# Patient Record
Sex: MALE | Race: ASIAN | NOT HISPANIC OR LATINO
[De-identification: names, ages, dates, MRNs, and addresses within clinical notes are randomized per-mention and may not be internally consistent; named-entity substitution may affect disease eponyms.]

---

## 2023-06-21 ENCOUNTER — NON-APPOINTMENT (OUTPATIENT)
Age: 63
End: 2023-06-21

## 2023-06-21 ENCOUNTER — APPOINTMENT (OUTPATIENT)
Dept: CARDIOLOGY | Facility: CLINIC | Age: 63
End: 2023-06-21
Payer: MEDICAID

## 2023-06-21 VITALS
DIASTOLIC BLOOD PRESSURE: 76 MMHG | BODY MASS INDEX: 25.91 KG/M2 | TEMPERATURE: 96.8 F | OXYGEN SATURATION: 97 % | RESPIRATION RATE: 18 BRPM | HEART RATE: 106 BPM | SYSTOLIC BLOOD PRESSURE: 182 MMHG | HEIGHT: 68.11 IN | WEIGHT: 171 LBS

## 2023-06-21 DIAGNOSIS — Z82.49 FAMILY HISTORY OF ISCHEMIC HEART DISEASE AND OTHER DISEASES OF THE CIRCULATORY SYSTEM: ICD-10-CM

## 2023-06-21 DIAGNOSIS — R07.89 OTHER CHEST PAIN: ICD-10-CM

## 2023-06-21 DIAGNOSIS — F17.200 NICOTINE DEPENDENCE, UNSPECIFIED, UNCOMPLICATED: ICD-10-CM

## 2023-06-21 DIAGNOSIS — Z78.9 OTHER SPECIFIED HEALTH STATUS: ICD-10-CM

## 2023-06-21 DIAGNOSIS — R06.02 SHORTNESS OF BREATH: ICD-10-CM

## 2023-06-21 PROBLEM — Z00.00 ENCOUNTER FOR PREVENTIVE HEALTH EXAMINATION: Status: ACTIVE | Noted: 2023-06-21

## 2023-06-21 PROCEDURE — 93015 CV STRESS TEST SUPVJ I&R: CPT

## 2023-06-21 PROCEDURE — 93306 TTE W/DOPPLER COMPLETE: CPT

## 2023-06-21 PROCEDURE — 99204 OFFICE O/P NEW MOD 45 MIN: CPT | Mod: 25

## 2023-06-21 PROCEDURE — 93000 ELECTROCARDIOGRAM COMPLETE: CPT | Mod: 59

## 2023-06-23 PROBLEM — R07.89 CHEST DISCOMFORT: Status: ACTIVE | Noted: 2023-06-21

## 2023-07-03 NOTE — ASSESSMENT
[FreeTextEntry1] : 62 year old M smoker with HTN and HLD who presents for cardiac evaluation.\par \par Pt states for the past 3-4 months, he has been experiencing CP in the morning while he was walking to take transportation to work. It is also associated with shortness of breath. He also notices his HRs a little higher than usual. He has no symptoms at rest. He tried to take a Chinese "heart saving" medication which might have helped with his symptoms. No palpitations, orthopnea, PND, LE edema, dizziness, or LOC. He denies any previous cardiac history. \par \par He is on ASA 81, atorva 20mg daily. He is on Toprol 50 daily and losartan 50mg daily.\par \par 1) Chest discomfort\par 2) SOB on exertion\par - EKG 6/21/23 showed sinus rhythm without significant abnormalities\par - Pt underwent treadmill stress for 6:41 min Kem protocol, developed mild chest discomfort/SOB (similar to what he was describing) and had 1.5 mm upsloping STDs, concerning for possible ischemia. He also had hypertensive response to stress\par - Pt underwent TTE which showed hyperdynamic LVEF 70-75%, normal diastolic function, normal RV size/function with mild AI, trace TR/trace MR\par - Given abnormal treadmill stress, I advised pt to undergo CCTA to r/o CAD\par - Continue ASA 81 and atorva 20mg daily for med mgt of possible CAD (last LDL 47)\par \par 3) HTN\par - I advised pt to take his BP medication consistently. Consider increasing losartan if persistently hypertensive.\par \par 4) Follow-up, pending CCTA

## 2023-07-03 NOTE — HISTORY OF PRESENT ILLNESS
[FreeTextEntry1] : 62 year old M smoker with HTN and HLD who presents for cardiac evaluation.\par \par Pt states for the past 3-4 months, he has been experiencing CP in the morning while he was walking to take transportation to work. It is also associated with shortness of breath. He also notices his HRs a little higher than usual. He has no symptoms at rest. He tried to take a Chinese "heart saving" medication which might have helped with his symptoms. No palpitations, orthopnea, PND, LE edema, dizziness, or LOC. He denies any previous cardiac history. \par \par He is on ASA 81, atorva 20mg daily. He is on Toprol 50 daily and losartan 50mg daily.

## 2023-07-19 ENCOUNTER — NON-APPOINTMENT (OUTPATIENT)
Age: 63
End: 2023-07-19

## 2023-07-19 RX ORDER — NITROGLYCERIN 0.4 MG/1
0.4 TABLET SUBLINGUAL
Qty: 60 | Refills: 0 | Status: ACTIVE | COMMUNITY
Start: 2023-07-19 | End: 1900-01-01

## 2023-07-25 ENCOUNTER — NON-APPOINTMENT (OUTPATIENT)
Age: 63
End: 2023-07-25

## 2023-07-25 ENCOUNTER — APPOINTMENT (OUTPATIENT)
Dept: CARDIOLOGY | Facility: CLINIC | Age: 63
End: 2023-07-25
Payer: MEDICAID

## 2023-07-25 VITALS
RESPIRATION RATE: 18 BRPM | HEART RATE: 87 BPM | DIASTOLIC BLOOD PRESSURE: 71 MMHG | OXYGEN SATURATION: 98 % | TEMPERATURE: 97 F | BODY MASS INDEX: 25.76 KG/M2 | SYSTOLIC BLOOD PRESSURE: 133 MMHG | WEIGHT: 170 LBS

## 2023-07-25 PROCEDURE — 93000 ELECTROCARDIOGRAM COMPLETE: CPT

## 2023-07-25 PROCEDURE — 99214 OFFICE O/P EST MOD 30 MIN: CPT | Mod: 25

## 2023-07-25 NOTE — HISTORY OF PRESENT ILLNESS
[FreeTextEntry1] : Pt had abnormal CCTA leading to LHC with BLUE placed to 99% mid RCA lesion on 7/21/23. He feels well post procedure. His atorva 20 was increased to 40mg daily. He has quit smoking cigarettes. No chest pain or shortness of breath. No issues with bleeding. He is going out of state for a month.\par \par 6/21/23 - Pt states for the past 3-4 months, he has been experiencing CP in the morning while he was walking to take transportation to work. It is also associated with shortness of breath. He also notices his HRs a little higher than usual. He has no symptoms at rest. He tried to take a Chinese "heart saving" medication which might have helped with his symptoms. No palpitations, orthopnea, PND, LE edema, dizziness, or LOC. He denies any previous cardiac history.

## 2023-07-25 NOTE — REASON FOR VISIT
[FreeTextEntry1] : 62 year old M smoker with HTN, HLD, abnormal CCTA now s/p BLUE to mid RCA 99% lesion who presents for f/u.\par \par [facility] 7/21/23 - mid LAD 30-40, mid RCA 99% s/p BLUE, pLCx 30%\par \par He is on ASA 81, Brilinta 90mg BID, and atorva 40mg daily. He is on Toprol 50 daily and losartan 50mg daily.

## 2023-07-25 NOTE — ASSESSMENT
[FreeTextEntry1] : 62 year old M smoker with HTN, HLD, abnormal CCTA now s/p BLUE to mid RCA 99% lesion who presents for f/u.\par \par [facility] 7/21/23 - mid LAD 30-40, mid RCA 99% s/p BLUE, pLCx 30%\par \par He is on ASA 81, Brilinta 90mg BID, and atorva 40mg daily. He is on Toprol 50 daily and losartan 50mg daily. \par \par Pt had abnormal CCTA leading to LHC with BLUE placed to 99% mid RCA lesion on 7/21/23. He feels well post procedure. His atorva 20 was increased to 40mg daily. He has quit smoking cigarettes. No chest pain or shortness of breath. No issues with bleeding. He is going out of state for a month.\par \par 1) CAD s/p RCA stent, done for abnormal treadmill stress then abnormal CCTA\par - TTE 6/21/23 hyperdynamic LVEF 70-75%, normal diastolic function, normal RV size/function with mild AI, trace TR/trace MR\par - EKG 6/21/23 showed sinus rhythm without significant abnormalities\par - He is currently asymptomatic\par - Continue ASA 81 and Brilinta 90mg BID, at least 6 months, ideally 1 month\par - Continue Atorva 40mg daily (last LDL 47, goal 55)\par - He already quit smoking cigarettes\par - Monitor for signs/symptoms of bleeding\par \par 2) HTN\par - Continue metop and losartan \par \par 3) Follow-up, 2 months

## 2023-09-26 ENCOUNTER — RESULT CHARGE (OUTPATIENT)
Age: 63
End: 2023-09-26

## 2023-09-27 ENCOUNTER — NON-APPOINTMENT (OUTPATIENT)
Age: 63
End: 2023-09-27

## 2023-09-27 ENCOUNTER — APPOINTMENT (OUTPATIENT)
Dept: CARDIOLOGY | Facility: CLINIC | Age: 63
End: 2023-09-27
Payer: MEDICAID

## 2023-09-27 VITALS
HEART RATE: 84 BPM | BODY MASS INDEX: 26.22 KG/M2 | SYSTOLIC BLOOD PRESSURE: 128 MMHG | OXYGEN SATURATION: 98 % | WEIGHT: 173 LBS | DIASTOLIC BLOOD PRESSURE: 71 MMHG | TEMPERATURE: 97.3 F | RESPIRATION RATE: 18 BRPM

## 2023-09-27 DIAGNOSIS — I10 ESSENTIAL (PRIMARY) HYPERTENSION: ICD-10-CM

## 2023-09-27 DIAGNOSIS — I25.10 ATHEROSCLEROTIC HEART DISEASE OF NATIVE CORONARY ARTERY W/OUT ANGINA PECTORIS: ICD-10-CM

## 2023-09-27 DIAGNOSIS — Z95.820 PERIPHERAL VASCULAR ANGIOPLASTY STATUS WITH IMPLANTS AND GRAFTS: ICD-10-CM

## 2023-09-27 DIAGNOSIS — Z86.39 PERSONAL HISTORY OF OTHER ENDOCRINE, NUTRITIONAL AND METABOLIC DISEASE: ICD-10-CM

## 2023-09-27 PROCEDURE — 99214 OFFICE O/P EST MOD 30 MIN: CPT | Mod: 25

## 2023-09-27 PROCEDURE — 93000 ELECTROCARDIOGRAM COMPLETE: CPT

## 2023-09-27 RX ORDER — ATORVASTATIN CALCIUM 40 MG/1
40 TABLET, FILM COATED ORAL
Qty: 1 | Refills: 1 | Status: ACTIVE | COMMUNITY
Start: 2023-09-27 | End: 1900-01-01

## 2023-09-27 RX ORDER — LOSARTAN POTASSIUM 50 MG/1
50 TABLET, FILM COATED ORAL DAILY
Qty: 90 | Refills: 1 | Status: ACTIVE | COMMUNITY
Start: 2023-09-27 | End: 1900-01-01

## 2023-09-27 RX ORDER — METOPROLOL SUCCINATE 50 MG/1
50 TABLET, EXTENDED RELEASE ORAL DAILY
Qty: 90 | Refills: 1 | Status: ACTIVE | COMMUNITY
Start: 2023-09-27 | End: 1900-01-01

## 2023-09-27 RX ORDER — TICAGRELOR 90 MG/1
90 TABLET ORAL
Qty: 180 | Refills: 1 | Status: ACTIVE | COMMUNITY
Start: 2023-09-27 | End: 1900-01-01

## 2024-06-24 RX ORDER — CLOPIDOGREL BISULFATE 75 MG/1
75 TABLET, FILM COATED ORAL DAILY
Qty: 90 | Refills: 1 | Status: ACTIVE | COMMUNITY
Start: 2024-06-24 | End: 1900-01-01

## 2024-06-24 RX ORDER — CLOPIDOGREL BISULFATE 300 MG/1
300 TABLET, FILM COATED ORAL
Qty: 2 | Refills: 0 | Status: ACTIVE | COMMUNITY
Start: 2024-06-24 | End: 1900-01-01

## 2024-08-18 NOTE — ASSESSMENT
[FreeTextEntry1] : 1) CAD s/p RCA stent, done for abnormal treadmill stress then abnormal CCTA - TTE 6/21/23 hyperdynamic LVEF 70-75%, normal diastolic function, normal RV size/function with mild AI, trace TR/trace MR - He remains asymptomatic at this time. He is euvolemic on exam. EKG today shows sinus rhythm without ischemic changes. - Continue ASA 81 and Brilinta 90mg BID, at least 6 months, ideally 1 year - Continue Atorva 40mg daily (last LDL 47, goal 55) - Continue Toprol 50mg XL daily, can continue to uptitrate if he has symptoms - Monitor for signs/symptoms of bleeding  2) HTN - Continue losartan 50mg daily  3) Follow-up,

## 2024-08-18 NOTE — HISTORY OF PRESENT ILLNESS
[FreeTextEntry1] :  9/27/23 - He feels good without CP or SOB. No issues with bleeding. He is taking all his medications as prescribed. He has to go out of state for approximately 6 months for work in [place].  7/25/23 - Pt had abnormal CCTA leading to LHC with BLUE placed to 99% mid RCA lesion on 7/21/23. He feels well post procedure. His atorva 20 was increased to 40mg daily. He has quit smoking cigarettes. No chest pain or shortness of breath. No issues with bleeding. He is going out of state for a month.  6/21/23 - Pt states for the past 3-4 months, he has been experiencing CP in the morning while he was walking to take transportation to work. It is also associated with shortness of breath. He also notices his HRs a little higher than usual. He has no symptoms at rest. He tried to take a Chinese "heart saving" medication which might have helped with his symptoms. No palpitations, orthopnea, PND, LE edema, dizziness, or LOC. He denies any previous cardiac history.

## 2024-08-18 NOTE — REASON FOR VISIT
[Symptom and Test Evaluation] : symptom and test evaluation [Coronary Artery Disease] : coronary artery disease [FreeTextEntry1] : 64 year old M former smoker with HTN, HLD, abnormal CCTA now s/p [facility] 7/21/23 with BLUE to mid RCA 99% lesion who presents for f/u.  [facility] 7/21/23 - mid LAD 30-40, mid RCA 99% s/p BLUE, pLCx 30%  He is on ASA 81, Plavix 75mg, and atorva 40mg daily. He is on Toprol 50 daily and losartan 50mg daily.

## 2024-08-19 ENCOUNTER — APPOINTMENT (OUTPATIENT)
Dept: CARDIOLOGY | Facility: CLINIC | Age: 64
End: 2024-08-19

## 2024-12-10 ENCOUNTER — NON-APPOINTMENT (OUTPATIENT)
Age: 64
End: 2024-12-10

## 2024-12-10 ENCOUNTER — APPOINTMENT (OUTPATIENT)
Dept: CARDIOLOGY | Facility: CLINIC | Age: 64
End: 2024-12-10
Payer: COMMERCIAL

## 2024-12-10 VITALS
OXYGEN SATURATION: 96 % | WEIGHT: 183 LBS | BODY MASS INDEX: 27.74 KG/M2 | DIASTOLIC BLOOD PRESSURE: 76 MMHG | RESPIRATION RATE: 18 BRPM | SYSTOLIC BLOOD PRESSURE: 152 MMHG | HEART RATE: 83 BPM

## 2024-12-10 DIAGNOSIS — Z95.820 PERIPHERAL VASCULAR ANGIOPLASTY STATUS WITH IMPLANTS AND GRAFTS: ICD-10-CM

## 2024-12-10 DIAGNOSIS — I10 ESSENTIAL (PRIMARY) HYPERTENSION: ICD-10-CM

## 2024-12-10 DIAGNOSIS — Z86.39 PERSONAL HISTORY OF OTHER ENDOCRINE, NUTRITIONAL AND METABOLIC DISEASE: ICD-10-CM

## 2024-12-10 DIAGNOSIS — I25.10 ATHEROSCLEROTIC HEART DISEASE OF NATIVE CORONARY ARTERY W/OUT ANGINA PECTORIS: ICD-10-CM

## 2024-12-10 PROCEDURE — 93000 ELECTROCARDIOGRAM COMPLETE: CPT

## 2024-12-10 PROCEDURE — 99214 OFFICE O/P EST MOD 30 MIN: CPT | Mod: 25

## 2025-06-10 ENCOUNTER — APPOINTMENT (OUTPATIENT)
Dept: CARDIOLOGY | Facility: CLINIC | Age: 65
End: 2025-06-10